# Patient Record
Sex: MALE | Race: WHITE | NOT HISPANIC OR LATINO | URBAN - METROPOLITAN AREA
[De-identification: names, ages, dates, MRNs, and addresses within clinical notes are randomized per-mention and may not be internally consistent; named-entity substitution may affect disease eponyms.]

---

## 2018-08-30 ENCOUNTER — OFFICE VISIT (OUTPATIENT)
Dept: URBAN - METROPOLITAN AREA CLINIC 29 | Facility: CLINIC | Age: 78
End: 2018-08-30
Payer: MEDICARE

## 2018-08-30 PROCEDURE — 99213 OFFICE O/P EST LOW 20 MIN: CPT | Performed by: OPHTHALMOLOGY

## 2018-08-30 ASSESSMENT — INTRAOCULAR PRESSURE
OS: 15
OD: 14

## 2018-08-30 NOTE — IMPRESSION/PLAN
Impression: Primary open-angle glaucoma, left eye, severe stage. Code: W69.7541. IOP ou doing well -
ONH stable ou - Plan: Discussed diagnosis, explained and understood by patient. Discussed IOP/ONH/Glaucoma management and risks. Continue lumigan qhs ou and azopt tid ou. Will continue to monitor pressure.

## 2019-01-17 ENCOUNTER — OFFICE VISIT (OUTPATIENT)
Dept: URBAN - METROPOLITAN AREA CLINIC 29 | Facility: CLINIC | Age: 79
End: 2019-01-17
Payer: MEDICARE

## 2019-01-17 PROCEDURE — 92133 CPTRZD OPH DX IMG PST SGM ON: CPT | Performed by: OPHTHALMOLOGY

## 2019-01-17 PROCEDURE — 99214 OFFICE O/P EST MOD 30 MIN: CPT | Performed by: OPHTHALMOLOGY

## 2019-01-17 ASSESSMENT — INTRAOCULAR PRESSURE
OS: 13
OD: 13

## 2019-01-17 NOTE — IMPRESSION/PLAN
Impression: Primary open-angle glaucoma, left eye, severe stage. Code: X07.7417. IOP ou doing well -
ONH stable ou - Plan: Discussed diagnosis, explained and understood by patient. Discussed IOP/ONH/Glaucoma management and risks. OCT ordered and reviewed results with patient. Continue lumigan qhs ou and azopt tid ou. Will continue to monitor condition and symptoms.

## 2019-04-16 ENCOUNTER — OFFICE VISIT (OUTPATIENT)
Dept: URBAN - METROPOLITAN AREA CLINIC 29 | Facility: CLINIC | Age: 79
End: 2019-04-16
Payer: MEDICARE

## 2019-04-16 PROCEDURE — 99213 OFFICE O/P EST LOW 20 MIN: CPT | Performed by: OPHTHALMOLOGY

## 2019-04-16 ASSESSMENT — INTRAOCULAR PRESSURE
OD: 16
OS: 16

## 2019-04-16 NOTE — IMPRESSION/PLAN
Impression: Primary open-angle glaucoma, left eye, severe stage. Code: D97.7791. IOP/ONH stable ou Plan: Discussed diagnosis, explained and understood by patient. Discussed IOP/ONH/Glaucoma management and risks. Continue lumigan qhs ou and azopt tid ou. Will continue to monitor condition and symptoms.

## 2019-08-23 ENCOUNTER — OFFICE VISIT (OUTPATIENT)
Dept: URBAN - METROPOLITAN AREA CLINIC 29 | Facility: CLINIC | Age: 79
End: 2019-08-23
Payer: MEDICARE

## 2019-08-23 DIAGNOSIS — H40.1111 PRIMARY OPEN-ANGLE GLAUCOMA, RIGHT EYE, MILD STAGE: ICD-10-CM

## 2019-08-23 PROCEDURE — 99213 OFFICE O/P EST LOW 20 MIN: CPT | Performed by: OPHTHALMOLOGY

## 2019-08-23 PROCEDURE — 92083 EXTENDED VISUAL FIELD XM: CPT | Performed by: OPHTHALMOLOGY

## 2019-08-23 ASSESSMENT — INTRAOCULAR PRESSURE
OS: 15
OD: 15

## 2019-08-23 NOTE — IMPRESSION/PLAN
Impression: Primary open-angle glaucoma, left eye, severe stage. Code: D83.5647. CCT average OU
IOP/ONH stable ou Plan: Discussed diagnosis, explained and understood by patient. Discussed IOP/ONH/Glaucoma management and risks. VF and OCT ordered and reviewed today. Continue lumigan qhs ou and azopt tid ou. Will continue to monitor condition and symptoms.

## 2020-01-10 ENCOUNTER — OFFICE VISIT (OUTPATIENT)
Dept: URBAN - METROPOLITAN AREA CLINIC 29 | Facility: CLINIC | Age: 80
End: 2020-01-10
Payer: MEDICARE

## 2020-01-10 PROCEDURE — 99213 OFFICE O/P EST LOW 20 MIN: CPT | Performed by: OPHTHALMOLOGY

## 2020-01-10 PROCEDURE — 92133 CPTRZD OPH DX IMG PST SGM ON: CPT | Performed by: OPHTHALMOLOGY

## 2020-01-10 ASSESSMENT — INTRAOCULAR PRESSURE
OS: 15
OD: 13

## 2020-01-10 NOTE — IMPRESSION/PLAN
Impression: Primary open-angle glaucoma, left eye, severe stage. Code: M01.4097. CCT average OU
IOP/ONH stable ou Plan: Discussed diagnosis, explained and understood by patient. Discussed IOP/ONH/Glaucoma management and risks. OCT ordered and reviewed today. Continue lumigan qhs ou and azopt tid ou. Will continue to monitor condition and symptoms.

## 2020-05-15 ENCOUNTER — OFFICE VISIT (OUTPATIENT)
Dept: URBAN - METROPOLITAN AREA CLINIC 29 | Facility: CLINIC | Age: 80
End: 2020-05-15
Payer: MEDICARE

## 2020-05-15 PROCEDURE — 92012 INTRM OPH EXAM EST PATIENT: CPT | Performed by: OPHTHALMOLOGY

## 2020-05-15 ASSESSMENT — INTRAOCULAR PRESSURE
OS: 19
OD: 15

## 2020-05-15 NOTE — IMPRESSION/PLAN
Impression: Primary open-angle glaucoma, left eye, severe stage. Code: B49.2910.
h/o PPV x 2 for retinal disorder/trauma and lost vision during second surgery  Plan: Pt has Glaucoma    Gonio :        FIDENCIOYXD:[id number]      Today's IOP :15/19   Tmax & date:19/19 Target IOP low to mid teens Pt denies Fhx of Glaucoma Vision equal OU Last vf 8/2019 OD: Full OS:  Dense loss sparing central temporal 
C/D:  0.7/0.8 OD // 0.9 OS
OCT:66/62 1/20/10 Pt denies Sulfa Allergy   // Pt denies Lung /Heart dx Pt is currently using : lumigan qhs ou and azopt tid. No previous medications used  / Previously used medications : none Plan :
1. Continue Lumigan QHS OU Azopt TID OU 
2. IOP and condition appear stable today. No changes being made to current regimen. Recommend monitoring condition at this time. 
3. RTC 3 months dilated exam with V/F (Provider to Dilate)

## 2020-08-04 ENCOUNTER — OFFICE VISIT (OUTPATIENT)
Dept: URBAN - METROPOLITAN AREA CLINIC 29 | Facility: CLINIC | Age: 80
End: 2020-08-04
Payer: MEDICARE

## 2020-08-04 PROCEDURE — 92014 COMPRE OPH EXAM EST PT 1/>: CPT | Performed by: OPHTHALMOLOGY

## 2020-08-04 RX ORDER — BRIMONIDINE TARTRATE 2 MG/ML
0.2 % SOLUTION/ DROPS OPHTHALMIC
Qty: 3 | Refills: 3 | Status: INACTIVE
Start: 2020-08-04 | End: 2020-09-14

## 2020-08-04 ASSESSMENT — INTRAOCULAR PRESSURE
OS: 19
OD: 14

## 2020-08-04 NOTE — IMPRESSION/PLAN
Impression: Primary open-angle glaucoma, left eye, severe stage. Code: A42.5493.
h/o PPV x 2 for retinal disorder/trauma and lost vision during second surgery Plan: Pt has Glaucoma    Gonio :   no gonio     Pachs:[phone]      Today's IOP : 14, 19     Tmax & date:19/19 Target IOP low to mid teens Pt denies Fhx of Glaucoma Vision equal OU Last vf 8/2019 OD: Full OS:  Dense loss sparing central temporal 
C/D:  0.7/0.8 OD // 0.9 OS
OCT:66/62 1/20/10 Pt denies Sulfa Allergy   // Pt denies Lung /Heart dx Pt is currently using : lumigan qhs ou and azopt tid. No previous medications used Plan :
1. Cont:
Lumigan QHS OU Azopt TID OU ADD Brimonidine BID OS 2. Patient's IOP is elevated OS ; will add new medication and return for IOP check. 
3. Return in 6-8 weeks for IOP check

## 2020-08-26 ENCOUNTER — OFFICE VISIT (OUTPATIENT)
Dept: URBAN - METROPOLITAN AREA CLINIC 29 | Facility: CLINIC | Age: 80
End: 2020-08-26
Payer: MEDICARE

## 2020-08-26 DIAGNOSIS — H10.9 CONJUNCTIVITIS: Primary | ICD-10-CM

## 2020-08-26 PROCEDURE — 99213 OFFICE O/P EST LOW 20 MIN: CPT | Performed by: OPHTHALMOLOGY

## 2020-08-26 RX ORDER — PREDNISOLONE ACETATE 10 MG/ML
1 % SUSPENSION/ DROPS OPHTHALMIC
Qty: 5 | Refills: 0 | Status: INACTIVE
Start: 2020-08-26 | End: 2020-09-14

## 2020-08-26 NOTE — IMPRESSION/PLAN
Impression: Conjunctivitis: H10.9. mild d/c, could be mild infection vs inflammatory Plan: ofloxacin q3hr, pf tid 8/27 addendum: This could possibly be COVID, recommended pt get tested.

## 2020-09-01 ENCOUNTER — OFFICE VISIT (OUTPATIENT)
Dept: URBAN - METROPOLITAN AREA CLINIC 29 | Facility: CLINIC | Age: 80
End: 2020-09-01
Payer: MEDICARE

## 2020-09-01 DIAGNOSIS — H40.1123 PRIMARY OPEN-ANGLE GLAUCOMA, LEFT EYE, SEVERE STAGE: Primary | ICD-10-CM

## 2020-09-01 PROCEDURE — 92012 INTRM OPH EXAM EST PATIENT: CPT | Performed by: OPHTHALMOLOGY

## 2020-09-01 ASSESSMENT — INTRAOCULAR PRESSURE
OS: 14
OD: 13

## 2020-09-01 NOTE — IMPRESSION/PLAN
Impression: Conjunctivitis: H10.9. mild d/c, could be mild infection vs inflammatory Plan: Improved. Will stop Pred and Ofloxacin.

## 2020-09-14 ENCOUNTER — OFFICE VISIT (OUTPATIENT)
Dept: URBAN - METROPOLITAN AREA CLINIC 29 | Facility: CLINIC | Age: 80
End: 2020-09-14
Payer: MEDICARE

## 2020-09-14 PROCEDURE — 99202 OFFICE O/P NEW SF 15 MIN: CPT | Performed by: OPTOMETRIST

## 2020-09-14 RX ORDER — NETARSUDIL AND LATANOPROST OPHTHALMIC SOLUTION, 0.02%/0.005% .2; .05 MG/ML; MG/ML
SOLUTION/ DROPS OPHTHALMIC; TOPICAL
Qty: 1 | Refills: 12 | Status: INACTIVE
Start: 2020-09-14 | End: 2020-10-01

## 2020-09-14 RX ORDER — BIMATOPROST 0.1 MG/ML
0.01 % SOLUTION/ DROPS OPHTHALMIC
Qty: 7.5 | Refills: 11 | Status: INACTIVE
Start: 2020-09-14 | End: 2021-02-01

## 2020-09-14 ASSESSMENT — INTRAOCULAR PRESSURE
OD: 12
OS: 22

## 2020-09-14 NOTE — IMPRESSION/PLAN
Impression: Acute atopic conjunctivitis, left eye: H10.12. Secondary to Brimonidine Plan: Discussed diagnosis in detail with patient. Advised patient to D/C Prednisolone &  Brimonidine due to causing reaction. New medication(s) Rx given today, Rocklatan QHS OS. Discussed posable side affect of new medications. Will continue to observe condition and or symptoms. Patient instructed to call if side affects occur or if symptoms worsen. Reassured patient of current condition and treatment.  Continue with Azopt TID OU, Lumigan QHS OD.

## 2020-09-17 ENCOUNTER — OFFICE VISIT (OUTPATIENT)
Dept: URBAN - METROPOLITAN AREA CLINIC 29 | Facility: CLINIC | Age: 80
End: 2020-09-17
Payer: MEDICARE

## 2020-09-17 DIAGNOSIS — H10.12 ACUTE ATOPIC CONJUNCTIVITIS, LEFT EYE: Primary | ICD-10-CM

## 2020-09-17 PROCEDURE — 99213 OFFICE O/P EST LOW 20 MIN: CPT | Performed by: OPTOMETRIST

## 2020-09-17 ASSESSMENT — INTRAOCULAR PRESSURE
OS: 14
OD: 14

## 2020-09-17 NOTE — IMPRESSION/PLAN
Impression: Acute atopic conjunctivitis, left eye: H10.12. Left. Condition: improving. Plan: Discussed diagnosis in detail with patient. Discussed treatment options with patient. Continue using current medication(s). Will continue to observe condition and or symptoms.

## 2020-10-01 ENCOUNTER — OFFICE VISIT (OUTPATIENT)
Dept: URBAN - METROPOLITAN AREA CLINIC 29 | Facility: CLINIC | Age: 80
End: 2020-10-01
Payer: MEDICARE

## 2020-10-01 PROCEDURE — 99213 OFFICE O/P EST LOW 20 MIN: CPT | Performed by: OPTOMETRIST

## 2020-10-01 RX ORDER — PREDNISOLONE ACETATE 10 MG/ML
1 % SUSPENSION/ DROPS OPHTHALMIC
Qty: 1 | Refills: 0 | Status: INACTIVE
Start: 2020-10-01 | End: 2020-10-08

## 2020-10-01 ASSESSMENT — INTRAOCULAR PRESSURE
OD: 12
OS: 10

## 2020-10-01 NOTE — IMPRESSION/PLAN
Impression: Acute atopic conjunctivitis, left eye: H10.12 Left. Plan: Discussed diagnosis in detail with patient. Discussed treatment options with patient. New medication(s) Rx given today. Will continue to observe condition and or symptoms. Patient instructed to call if condition gets worse. Reassured patient of current condition and treatment.

## 2020-10-08 ENCOUNTER — OFFICE VISIT (OUTPATIENT)
Dept: URBAN - METROPOLITAN AREA CLINIC 29 | Facility: CLINIC | Age: 80
End: 2020-10-08
Payer: MEDICARE

## 2020-10-08 PROCEDURE — 99213 OFFICE O/P EST LOW 20 MIN: CPT | Performed by: OPTOMETRIST

## 2020-10-08 NOTE — IMPRESSION/PLAN
Impression: Acute atopic conjunctivitis, left eye: H10.12 Left. Condition: resolved. Plan: Discussed diagnosis in detail with patient. Discussed treatment options with patient. Discontinue Med(s) as instructed.